# Patient Record
Sex: MALE | URBAN - METROPOLITAN AREA
[De-identification: names, ages, dates, MRNs, and addresses within clinical notes are randomized per-mention and may not be internally consistent; named-entity substitution may affect disease eponyms.]

---

## 2021-03-14 ENCOUNTER — OUTPATIENT (OUTPATIENT)
Dept: OUTPATIENT SERVICES | Facility: HOSPITAL | Age: 41
LOS: 1 days | End: 2021-03-14
Payer: COMMERCIAL

## 2021-03-14 ENCOUNTER — TRANSCRIPTION ENCOUNTER (OUTPATIENT)
Age: 41
End: 2021-03-14

## 2021-03-14 DIAGNOSIS — Z20.828 CONTACT WITH AND (SUSPECTED) EXPOSURE TO OTHER VIRAL COMMUNICABLE DISEASES: ICD-10-CM

## 2021-03-14 LAB — SARS-COV-2 RNA SPEC QL NAA+PROBE: SIGNIFICANT CHANGE UP

## 2021-03-14 PROCEDURE — U0003: CPT

## 2021-03-14 PROCEDURE — C9803: CPT

## 2021-03-14 PROCEDURE — U0005: CPT

## 2021-03-15 DIAGNOSIS — Z20.828 CONTACT WITH AND (SUSPECTED) EXPOSURE TO OTHER VIRAL COMMUNICABLE DISEASES: ICD-10-CM

## 2021-04-19 ENCOUNTER — TRANSCRIPTION ENCOUNTER (OUTPATIENT)
Age: 41
End: 2021-04-19

## 2021-04-19 ENCOUNTER — OUTPATIENT (OUTPATIENT)
Dept: OUTPATIENT SERVICES | Facility: HOSPITAL | Age: 41
LOS: 1 days | End: 2021-04-19
Payer: COMMERCIAL

## 2021-04-19 DIAGNOSIS — Z20.828 CONTACT WITH AND (SUSPECTED) EXPOSURE TO OTHER VIRAL COMMUNICABLE DISEASES: ICD-10-CM

## 2021-04-19 PROBLEM — Z00.00 ENCOUNTER FOR PREVENTIVE HEALTH EXAMINATION: Status: ACTIVE | Noted: 2021-04-19

## 2021-04-19 LAB — SARS-COV-2 RNA SPEC QL NAA+PROBE: SIGNIFICANT CHANGE UP

## 2021-04-19 PROCEDURE — C9803: CPT

## 2021-04-19 PROCEDURE — U0003: CPT

## 2021-04-19 PROCEDURE — U0005: CPT

## 2021-04-20 DIAGNOSIS — Z20.828 CONTACT WITH AND (SUSPECTED) EXPOSURE TO OTHER VIRAL COMMUNICABLE DISEASES: ICD-10-CM

## 2021-05-20 ENCOUNTER — OUTPATIENT (OUTPATIENT)
Dept: OUTPATIENT SERVICES | Facility: HOSPITAL | Age: 41
LOS: 1 days | End: 2021-05-20
Payer: COMMERCIAL

## 2021-05-20 DIAGNOSIS — Z20.828 CONTACT WITH AND (SUSPECTED) EXPOSURE TO OTHER VIRAL COMMUNICABLE DISEASES: ICD-10-CM

## 2021-05-20 LAB — SARS-COV-2 RNA SPEC QL NAA+PROBE: SIGNIFICANT CHANGE UP

## 2021-05-20 PROCEDURE — C9803: CPT

## 2021-05-20 PROCEDURE — U0003: CPT

## 2021-05-20 PROCEDURE — U0005: CPT

## 2021-05-21 DIAGNOSIS — Z20.828 CONTACT WITH AND (SUSPECTED) EXPOSURE TO OTHER VIRAL COMMUNICABLE DISEASES: ICD-10-CM

## 2021-07-14 ENCOUNTER — TRANSCRIPTION ENCOUNTER (OUTPATIENT)
Age: 41
End: 2021-07-14

## 2021-09-03 ENCOUNTER — NON-APPOINTMENT (OUTPATIENT)
Age: 41
End: 2021-09-03

## 2021-09-03 ENCOUNTER — APPOINTMENT (OUTPATIENT)
Dept: DERMATOLOGY | Facility: CLINIC | Age: 41
End: 2021-09-03
Payer: COMMERCIAL

## 2021-09-03 PROCEDURE — 99204 OFFICE O/P NEW MOD 45 MIN: CPT

## 2021-09-03 RX ORDER — KETOCONAZOLE 20 MG/G
2 CREAM TOPICAL
Refills: 0 | Status: ACTIVE | COMMUNITY

## 2021-09-03 RX ORDER — DESONIDE 0.5 MG/G
0.05 GEL TOPICAL
Refills: 0 | Status: ACTIVE | COMMUNITY

## 2021-09-03 RX ORDER — SULFACETAMIDE SODIUM/SULFUR 10-5%(W/V)
10-5 LOTION (GRAM) TOPICAL
Refills: 0 | Status: ACTIVE | COMMUNITY

## 2021-09-03 RX ORDER — PIMECROLIMUS 10 MG/G
1 CREAM TOPICAL
Refills: 0 | Status: ACTIVE | COMMUNITY

## 2021-09-03 NOTE — PHYSICAL EXAM
[FreeTextEntry3] : erythematous, lichenified plaques;  granulomatous appearing;  in seb derm distribution;  brows, cheeks, periorbital; \par

## 2021-09-03 NOTE — HISTORY OF PRESENT ILLNESS
[de-identified] : Rash on face, itchy;  x approx. 1 year; \par has had biopsies in past; has had extensive w/u; \par currently using only OTC txs and sulfa 10/5 cleanser; \par OTC prep with sulfur, sal acid, HC\par has also used desonide in the past; \par Took one Rx for a month;  tetracycline; \par Works with [employer];  does work in [employer] in [employer]; \par \par \par \par

## 2021-09-03 NOTE — ASSESSMENT
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

## 2021-09-13 ENCOUNTER — NON-APPOINTMENT (OUTPATIENT)
Age: 41
End: 2021-09-13

## 2021-09-24 ENCOUNTER — APPOINTMENT (OUTPATIENT)
Dept: DERMATOLOGY | Facility: CLINIC | Age: 41
End: 2021-09-24
Payer: COMMERCIAL

## 2021-09-24 PROCEDURE — 99214 OFFICE O/P EST MOD 30 MIN: CPT

## 2021-09-24 NOTE — HISTORY OF PRESENT ILLNESS
[de-identified] : f/u for check of facial dermatitis;  \par some improvement s/p prednisone;  less itching;\par

## 2021-09-24 NOTE — ASSESSMENT
[FreeTextEntry1] : Rash;  improved, but still active; \par Likely seb derm with superimposed allergic contact; \par Had patch test (?true test) at other office, results not available\par \par Therapeutic options and their risks and benefits; along with multiple diagnostic possibilities were discussed at length; risks and benefits of further study were discussed;\par \par tacrolimus 0.1 BID;  \par set up repeat patch testing

## 2021-10-11 ENCOUNTER — APPOINTMENT (OUTPATIENT)
Dept: DERMATOLOGY | Facility: CLINIC | Age: 41
End: 2021-10-11
Payer: COMMERCIAL

## 2021-10-11 PROCEDURE — 95044 PATCH/APPLICATION TESTS: CPT

## 2021-10-13 ENCOUNTER — APPOINTMENT (OUTPATIENT)
Dept: DERMATOLOGY | Facility: CLINIC | Age: 41
End: 2021-10-13
Payer: COMMERCIAL

## 2021-10-13 PROCEDURE — 99213 OFFICE O/P EST LOW 20 MIN: CPT

## 2021-10-15 ENCOUNTER — APPOINTMENT (OUTPATIENT)
Dept: DERMATOLOGY | Facility: CLINIC | Age: 41
End: 2021-10-15
Payer: COMMERCIAL

## 2021-10-15 DIAGNOSIS — R21 RASH AND OTHER NONSPECIFIC SKIN ERUPTION: ICD-10-CM

## 2021-10-15 PROCEDURE — 99214 OFFICE O/P EST MOD 30 MIN: CPT

## 2021-10-15 NOTE — ASSESSMENT
[FreeTextEntry1] : ACD;\par Patches with trace fragrance reactions;  doubt relevance; \par Avoid fragrances;  using Vanicream products; \par \par Therapeutic options and their risks and benefits; along with multiple diagnostic possibilities were discussed at length; risks and benefits of further study were discussed;\par \par Pt. is doing very well on tacrolimus ointment;  continue to use qd-BID;  ? underlying seb derm with rebound; \par continue tx; \par Vanicream sunblock on upcoming vacation;\par f/u 2 mos; before winter\par

## 2021-10-15 NOTE — PHYSICAL EXAM
[FreeTextEntry3] : Patch Test Reading  #1  #2\par \par Days after application:  \par \par Total number of patch tests:  80\par \par Panel 1:  All negative\par Panel 2:  All negative\par Panel 3:  All negative\par Panel 4:  All negative\par Panel 5:  All negative\par Panel 6:  Trace to #52 Balsam [place], #54 Fragrance mix 2\par Panel 7:  All negative\par Panel 8:  All negative\par \par \par

## 2021-10-15 NOTE — PHYSICAL EXAM
[FreeTextEntry3] : Patch Test Reading  #1  #2\par \par Days after application:  \par \par Total number of patch tests:  80\par \par Panel 1:  All negative\par Panel 2:  All negative\par Panel 3:  All negative\par Panel 4:  All negative\par Panel 5:  All negative\par Panel 6:  All negative\par Panel 7:  All negative\par Panel 8:  All negative\par \par \par

## 2021-10-25 ENCOUNTER — TRANSCRIPTION ENCOUNTER (OUTPATIENT)
Age: 41
End: 2021-10-25

## 2021-11-02 ENCOUNTER — TRANSCRIPTION ENCOUNTER (OUTPATIENT)
Age: 41
End: 2021-11-02

## 2021-11-17 ENCOUNTER — TRANSCRIPTION ENCOUNTER (OUTPATIENT)
Age: 41
End: 2021-11-17

## 2021-12-21 ENCOUNTER — TRANSCRIPTION ENCOUNTER (OUTPATIENT)
Age: 41
End: 2021-12-21

## 2021-12-28 ENCOUNTER — TRANSCRIPTION ENCOUNTER (OUTPATIENT)
Age: 41
End: 2021-12-28

## 2022-04-18 ENCOUNTER — TRANSCRIPTION ENCOUNTER (OUTPATIENT)
Age: 42
End: 2022-04-18

## 2023-04-04 ENCOUNTER — APPOINTMENT (OUTPATIENT)
Dept: DERMATOLOGY | Facility: CLINIC | Age: 43
End: 2023-04-04
Payer: COMMERCIAL

## 2023-04-04 DIAGNOSIS — L23.9 ALLERGIC CONTACT DERMATITIS, UNSPECIFIED CAUSE: ICD-10-CM

## 2023-04-04 PROCEDURE — 99214 OFFICE O/P EST MOD 30 MIN: CPT

## 2023-04-04 RX ORDER — TACROLIMUS 1 MG/G
0.1 OINTMENT TOPICAL
Qty: 1 | Refills: 3 | Status: ACTIVE | COMMUNITY
Start: 2021-09-24 | End: 1900-01-01

## 2023-04-04 RX ORDER — PREDNISONE 20 MG/1
20 TABLET ORAL
Qty: 24 | Refills: 0 | Status: ACTIVE | COMMUNITY
Start: 2021-09-03 | End: 1900-01-01

## 2023-04-04 NOTE — ASSESSMENT
[FreeTextEntry1] : ACD/ atopic derm;\par \par Therapeutic options and their risks and benefits; along with multiple diagnostic possibilities were discussed at length; risks and benefits of further study were discussed;\par \par discussed double checking possible exposure to fragrance;  + patch test 2 y ago\par \par Prednisone taper;  13d\par renew tacrolimus 0.1 ointment BID\par \par f/u if fails to improve;  did well on this regiment in past; \par

## 2023-04-04 NOTE — HISTORY OF PRESENT ILLNESS
[de-identified] : f/u for check of itchy rashes, primarily on neck\par Hx patch testing in 2021 for severe facial eczema;  + for fragrance & Balsam [place];  also had biopsy\par \par Had done well in past with tacrolimus;

## 2023-04-04 NOTE — PHYSICAL EXAM
[FreeTextEntry3] : Erythematous scaling patches with inflammation \par L > R neck, extending to shoulder; \par mild involvement of face

## 2023-11-28 ENCOUNTER — NON-APPOINTMENT (OUTPATIENT)
Age: 43
End: 2023-11-28

## 2024-03-29 ENCOUNTER — NON-APPOINTMENT (OUTPATIENT)
Age: 44
End: 2024-03-29

## 2024-06-04 ENCOUNTER — NON-APPOINTMENT (OUTPATIENT)
Age: 44
End: 2024-06-04